# Patient Record
Sex: FEMALE | Race: WHITE
[De-identification: names, ages, dates, MRNs, and addresses within clinical notes are randomized per-mention and may not be internally consistent; named-entity substitution may affect disease eponyms.]

---

## 2018-09-12 ENCOUNTER — HOSPITAL ENCOUNTER (INPATIENT)
Dept: HOSPITAL 41 - JD.OBCHECK | Age: 23
LOS: 2 days | Discharge: HOME | End: 2018-09-14
Attending: OBSTETRICS & GYNECOLOGY | Admitting: OBSTETRICS & GYNECOLOGY
Payer: SELF-PAY

## 2018-09-12 DIAGNOSIS — N85.8: ICD-10-CM

## 2018-09-12 DIAGNOSIS — Z87.891: ICD-10-CM

## 2018-09-12 DIAGNOSIS — Z3A.38: ICD-10-CM

## 2018-09-12 DIAGNOSIS — Z88.0: ICD-10-CM

## 2018-09-12 DIAGNOSIS — O34.219: Primary | ICD-10-CM

## 2018-09-12 PROCEDURE — 3E0R3BZ INTRODUCTION OF ANESTHETIC AGENT INTO SPINAL CANAL, PERCUTANEOUS APPROACH: ICD-10-PCS

## 2018-09-12 PROCEDURE — 10907ZC DRAINAGE OF AMNIOTIC FLUID, THERAPEUTIC FROM PRODUCTS OF CONCEPTION, VIA NATURAL OR ARTIFICIAL OPENING: ICD-10-PCS | Performed by: OBSTETRICS & GYNECOLOGY

## 2018-09-12 PROCEDURE — 00HU33Z INSERTION OF INFUSION DEVICE INTO SPINAL CANAL, PERCUTANEOUS APPROACH: ICD-10-PCS

## 2018-09-12 PROCEDURE — 0UQMXZZ REPAIR VULVA, EXTERNAL APPROACH: ICD-10-PCS | Performed by: OBSTETRICS & GYNECOLOGY

## 2018-09-12 PROCEDURE — 6A550ZT PHERESIS OF CORD BLOOD STEM CELLS, SINGLE: ICD-10-PCS | Performed by: OBSTETRICS & GYNECOLOGY

## 2018-09-12 PROCEDURE — 0KQM0ZZ REPAIR PERINEUM MUSCLE, OPEN APPROACH: ICD-10-PCS | Performed by: OBSTETRICS & GYNECOLOGY

## 2018-09-12 RX ADMIN — Medication SCH ML: at 07:39

## 2018-09-12 RX ADMIN — EPHEDRINE SULFATE PRN MG: 50 INJECTION, SOLUTION INTRAVENOUS at 15:40

## 2018-09-12 RX ADMIN — Medication SCH ML: at 16:25

## 2018-09-12 RX ADMIN — EPHEDRINE SULFATE PRN MG: 50 INJECTION, SOLUTION INTRAVENOUS at 08:52

## 2018-09-12 NOTE — PCM.PNLD
Labor Progress Note





- VS & Meds


Vital Signs: 


 Last Vital Signs











Temp  36.9 C   09/12/18 06:39


 


Pulse  102 H  09/12/18 06:39


 


Resp  18   09/12/18 07:43


 


BP  112/72   09/12/18 06:39


 


Pulse Ox      











Active Medications: 


 Current Medications





Diphenhydramine HCl (Benadryl)  25 mg IVPUSH Q6H PRN


   PRN Reason: pruritis


Ephedrine Sulfate (Ephedrine Sulfate)  5 mg IVPUSH ASDIRECTED PRN


   PRN Reason: Hypotension


   Last Admin: 09/12/18 15:40 Dose:  5 mg


Fentanyl (Sublimaze)  100 mcg EPIDUR Q3H PRN


   PRN Reason: Pain


   Last Admin: 09/12/18 07:39 Dose:  100 mcg


Fentanyl/Bupivacaine HCl (Fentanyl/Bupivacaine/Ns 2 Mcg-0.125% 100 Ml)  100 ml 

EPIDUR ASDIRECTED MARISSA


   Last Admin: 09/12/18 16:25 Dose:  100 ml


Lactated Ringer's (Ringers, Lactated)  1,000 mls @ 100 mls/hr IV ASDIRECTED MARISSA


   Last Admin: 09/12/18 13:59 Dose:  100 mls/hr


Oxytocin/Lactated Ringer's (Pitocin In Lr 10 Units/1,000 Ml)  10 unit in 1,000 

mls @ 500 mls/hr IV .CONTINUOUS MARISSA


Nalbuphine HCl (Nubain)  10 mg IVPUSH Q2H PRN


   PRN Reason: pain


Ondansetron HCl (Zofran)  4 mg IVPUSH Q4H PRN


   PRN Reason: Nausea/Vomiting


Sodium Chloride (Saline Flush)  10 ml FLUSH ASDIRECTED PRN


   PRN Reason: Keep Vein Open











- Uterine Contractions


Uterine Monitoring Mode: External [place]


Contraction Intensity: Moderate to Strong





- Fetal Monitoring


Fetal Monitor Mode: External Ultrasound


Fetal Heart Rate (FHR) Baseline: 155


Fetal Heart Rate (FHR) Variability: Moderate (6-25 bmp)


Fetal Accelerations: Present, 15x15


Fetal Decelerations: Variable


Fetal Strip Review: Category II





- Vaginal Exam


Dilation (cm): 9


Effacement (Percent): 90


Station: 1


Cervical Position: Anterior





- Labor Progress (Free Text)


Labor Progress: 





Patient has made slow, but stead change throughout the day.  Now feeling more 

pressure and with good change in fetal station to +1-+2 station.  Also 9 cm.  

Will recheck in another 30-45 minutes to reassess.

## 2018-09-12 NOTE — PCM.PNLD
Labor Progress Note





- VS & Meds


Vital Signs: 


 Last Vital Signs











Temp  36.9 C   09/12/18 06:39


 


Pulse  102 H  09/12/18 06:39


 


Resp  18   09/12/18 07:43


 


BP  112/72   09/12/18 06:39


 


Pulse Ox      











Active Medications: 


 Current Medications





Diphenhydramine HCl (Benadryl)  25 mg IVPUSH Q6H PRN


   PRN Reason: pruritis


Ephedrine Sulfate (Ephedrine Sulfate)  5 mg IVPUSH ASDIRECTED PRN


   PRN Reason: Hypotension


   Last Admin: 09/12/18 08:52 Dose:  5 mg


Fentanyl (Sublimaze)  100 mcg EPIDUR Q3H PRN


   PRN Reason: Pain


   Last Admin: 09/12/18 07:39 Dose:  100 mcg


Fentanyl/Bupivacaine HCl (Fentanyl/Bupivacaine/Ns 2 Mcg-0.125% 100 Ml)  100 ml 

EPIDUR ASDIRECTED MARISSA


   Last Admin: 09/12/18 07:39 Dose:  100 ml


Lactated Ringer's (Ringers, Lactated)  1,000 mls @ 100 mls/hr IV ASDIRECTED MARISSA


   Last Admin: 09/12/18 10:52 Dose:  100 mls/hr


Oxytocin/Lactated Ringer's (Pitocin In Lr 10 Units/1,000 Ml)  10 unit in 1,000 

mls @ 500 mls/hr IV .CONTINUOUS MARISSA


Nalbuphine HCl (Nubain)  10 mg IVPUSH Q2H PRN


   PRN Reason: pain


Ondansetron HCl (Zofran)  4 mg IVPUSH Q4H PRN


   PRN Reason: Nausea/Vomiting


Sodium Chloride (Saline Flush)  10 ml FLUSH ASDIRECTED PRN


   PRN Reason: Keep Vein Open











- Uterine Contractions


Uterine Monitoring Mode: External [place]


Contraction Intensity: Moderate to Strong





- Fetal Monitoring


Fetal Monitor Mode: External Ultrasound


Fetal Heart Rate (FHR) Baseline: 155


Fetal Heart Rate (FHR) Variability: Moderate (6-25 bmp)


Fetal Accelerations: Present, 15x15


Fetal Decelerations: Late (isolated)


Fetal Strip Review: Category II





- Vaginal Exam


Dilation (cm): 5


Effacement (Percent): 80


Station: -1


Cervical Position: Midposition





- Labor Progress (Free Text)


Labor Progress: 





Doing well.  AROM performed with release of blood tinged clear fluid.  Continue 

present management

## 2018-09-12 NOTE — PCM.PNLD
Labor Progress Note





- VS & Meds


Vital Signs: 


 Last Vital Signs











Temp  36.9 C   09/12/18 06:39


 


Pulse  102 H  09/12/18 06:39


 


Resp  18   09/12/18 07:43


 


BP  112/72   09/12/18 06:39


 


Pulse Ox      











Active Medications: 


 Current Medications





Diphenhydramine HCl (Benadryl)  25 mg IVPUSH Q6H PRN


   PRN Reason: pruritis


Ephedrine Sulfate (Ephedrine Sulfate)  5 mg IVPUSH ASDIRECTED PRN


   PRN Reason: Hypotension


   Last Admin: 09/12/18 08:52 Dose:  5 mg


Fentanyl (Sublimaze)  100 mcg EPIDUR Q3H PRN


   PRN Reason: Pain


   Last Admin: 09/12/18 07:39 Dose:  100 mcg


Fentanyl/Bupivacaine HCl (Fentanyl/Bupivacaine/Ns 2 Mcg-0.125% 100 Ml)  100 ml 

EPIDUR ASDIRECTED MARISSA


   Last Admin: 09/12/18 07:39 Dose:  100 ml


Lactated Ringer's (Ringers, Lactated)  1,000 mls @ 100 mls/hr IV ASDIRECTED MARISSA


   Last Admin: 09/12/18 10:52 Dose:  100 mls/hr


Oxytocin/Lactated Ringer's (Pitocin In Lr 10 Units/1,000 Ml)  10 unit in 1,000 

mls @ 500 mls/hr IV .CONTINUOUS MARISSA


Nalbuphine HCl (Nubain)  10 mg IVPUSH Q2H PRN


   PRN Reason: pain


Ondansetron HCl (Zofran)  4 mg IVPUSH Q4H PRN


   PRN Reason: Nausea/Vomiting


Sodium Chloride (Saline Flush)  10 ml FLUSH ASDIRECTED PRN


   PRN Reason: Keep Vein Open











- Uterine Contractions


Uterine Monitoring Mode: External [place]


Contraction Intensity: Moderate to Strong





- Fetal Monitoring


Fetal Monitor Mode: External Ultrasound


Fetal Heart Rate (FHR) Baseline: 150


Fetal Heart Rate (FHR) Variability: Moderate (6-25 bmp)


Fetal Accelerations: Present, 15x15


Fetal Decelerations: Variable


Fetal Strip Review: Category II





- Vaginal Exam


Dilation (cm): 6


Effacement (Percent): 80


Station: 0


Cervical Position: Midposition





- Labor Progress (Free Text)


Labor Progress: 





Patient doing well.  Continues to make slow, but steady cervical change.  

Continue present management

## 2018-09-12 NOTE — PCM.DEL
L & D Note





- General Info


Date of Service: 18





- Delivery Note


Labor: Spontaneous


Delivery Outcome: Livebirth


Infant Delivery Method: Spontaneous Vaginal Delivery-Single


Infant Delivery Mode: Spontaneous


Birth Presentation: Right Occiput Anterior (SHANNEN)


Nuchal Cord: None


Anesthesia Type: Epidural


Amniotic Fluid Description: Bloody


Episiotomy Type: None


Laceration: 2nd Degree, Labial


Suture type: Vicryl


Suture size: 3-0


Placenta: Intact, Spontaneous


Cord: 3 Vessels


Estimated Blood Loss: 400


Resuscitation Needed: Yes


: Bulb Syringe, Stimulated, Warmed, [place] Used, Warmer Used


APGAR Score 1 min: 8


APGAR Score 5 min: 9


Delivery Comments (Free Text/Narrative):: 





Patient found to be complete and began pushing.  With maternal pushing effort 

fetal head delivered from an SHANNEN presentation.  No nuchal cord present.  With 

gentle downward traction the fetal shoulders and body delivered.  Infant placed 

on maternal abdomen.  Cord clamped and cut.  cord blood obtained.  Placenta 

allowed time to separate and expelled intact.  Inspection of the perineum 

showed a 2nd degree vaginal and left labial laceration.  These were repaired 

with 2-0 and 3-0 sutures respectively.  





- General Info


Date of Service: 18





- Patient Data


Vitals - Most Recent: 


 Last Vital Signs











Temp  36.9 C   18 06:39


 


Pulse  102 H  18 06:39


 


Resp  18   18 07:43


 


BP  112/72   18 06:39


 


Pulse Ox      











Weight - Most Recent: 68.901 kg


Lab Results Last 24 Hours: 


 Laboratory Results - last 24 hr











  18 Range/Units





  07:00 07:00 07:00 


 


WBC  12.68 H    (3.98-10.04)  K/mm3


 


RBC  3.85 L    (3.98-5.22)  M/mm3


 


Hgb  12.0    (11.2-15.7)  gm/L


 


Hct  36.6    (34.1-44.9)  %


 


MCV  95.1 H    (79.4-94.8)  fl


 


MCH  31.2    (25.6-32.2)  pg


 


MCHC  32.8    (32.2-35.5)  g/dl


 


RDW Std Deviation  44.6    (36.4-46.3)  fL


 


Plt Count  212    (182-369)  K/mm3


 


MPV  10.0    (9.4-12.3)  fl


 


RPR   Non-reactive   (NONREACTIVE)  


 


Blood Type    A POSITIVE  


 


Gel Antibody Screen    Negative  











Med Orders - Current: 


 Current Medications





Diphenhydramine HCl (Benadryl)  25 mg IVPUSH Q6H PRN


   PRN Reason: pruritis


Ephedrine Sulfate (Ephedrine Sulfate)  5 mg IVPUSH ASDIRECTED PRN


   PRN Reason: Hypotension


   Last Admin: 18 15:40 Dose:  5 mg


Fentanyl (Sublimaze)  100 mcg EPIDUR Q3H PRN


   PRN Reason: Pain


   Last Admin: 18 07:39 Dose:  100 mcg


Fentanyl/Bupivacaine HCl (Fentanyl/Bupivacaine/Ns 2 Mcg-0.125% 100 Ml)  100 ml 

EPIDUR ASDIRECTED MARISSA


   Last Admin: 18 16:25 Dose:  100 ml


Lactated Ringer's (Ringers, Lactated)  1,000 mls @ 100 mls/hr IV ASDIRECTED MARISSA


   Last Admin: 18 13:59 Dose:  100 mls/hr


Oxytocin/Lactated Ringer's (Pitocin In Lr 10 Units/1,000 Ml)  10 unit in 1,000 

mls @ 500 mls/hr IV .CONTINUOUS MARISSA


   Last Admin: 18 17:16 Dose:  500 mls/hr


Nalbuphine HCl (Nubain)  10 mg IVPUSH Q2H PRN


   PRN Reason: pain


Ondansetron HCl (Zofran)  4 mg IVPUSH Q4H PRN


   PRN Reason: Nausea/Vomiting


Sodium Chloride (Saline Flush)  10 ml FLUSH ASDIRECTED PRN


   PRN Reason: Keep Vein Open





Discontinued Medications





Methylergonovine Maleate (Methergine) Confirm Administered Dose 0.2 mg .ROUTE 

.STK-MED ONE


   Stop: 18 17:13


   Last Admin: 18 17:17 Dose:  0.2 mg


Methylergonovine Maleate (Methergine)  0.2 mg IM NOW STA


   Stop: 18 17:37











- Problem List & Annotations


(1) 38 weeks gestation of pregnancy


SNOMED Code(s): 68767112


   Code(s): Z3A.38 - 38 WEEKS GESTATION OF PREGNANCY   Status: Acute   Current 

Visit: Yes   





(2) Normal labor


SNOMED Code(s): 89331140


   Code(s): O80 - ENCOUNTER FOR FULL-TERM UNCOMPLICATED DELIVERY; Z37.9 - 

OUTCOME OF DELIVERY, UNSPECIFIED   Status: Acute   Current Visit: Yes   





(3) History of 


SNOMED Code(s): 622125500


   Code(s): Z98.891 - HISTORY OF UTERINE SCAR FROM PREVIOUS SURGERY   Status: 

Acute   Current Visit: Yes   





(4) Desires  (vaginal birth after ) trial


SNOMED Code(s): 805866844, 245049616


   Code(s): O34.219 - MATERNAL CARE FOR UNSP TYPE SCAR FROM PREVIOUS  

DEL   Status: Acute   Current Visit: Yes   





(5) , delivered, current hospitalization


SNOMED Code(s): 287375137


   Code(s): O34.219 - MATERNAL CARE FOR UNSP TYPE SCAR FROM PREVIOUS  

DEL   Status: Acute   Current Visit: Yes   





- Problem List Review


Problem List Initiated/Reviewed/Updated: Yes





- My Orders


Last 24 Hours: 


My Active Orders





18 06:55


Patient Status [ADT] Routine 


Activity as Tolerated [RC] PFP 


Communication Order [RC] ASDIRECTED 


Fetal Heart Tones [RC] ASDIRECTED 


Fetal Non Stress Test [RC] PER UNIT ROUTINE 


Notify Provider [RC] PFP 


Notify Provider [RC] PRN 


Peripheral IV Care [RC] .AS DIRECTED 


Vital Signs [RC] PER UNIT ROUTINE 


Nalbuphine [Nubain]   10 mg IVPUSH Q2H PRN 


Ondansetron [Zofran]   4 mg IVPUSH Q4H PRN 


Sodium Chloride 0.9% [Saline Flush]   10 ml FLUSH ASDIRECTED PRN 


Electronic Fetal Heart Tones Ext w TOCO [WOMSER] Routine 


Electronic Fetal Heart Tones Internal [WOMSER] Per Unit Routine 


Peripheral IV Insertion Adult [OM.PC] Routine 


Resuscitation Status Routine 





18 07:00


PATIENT RETYPE [BBK] Routine 


TYPE AND SCREEN [BBK] Routine 


Lactated Ringers [Ringers, Lactated] 1,000 ml IV ASDIRECTED 


Oxytocin/Lactated Ringers [Pitocin in LR 10 Units/1,000 ML] 10 unit in 1,000 ml 

IV .CONTINUOUS 





18 07:40


Insert Hall Catheter [Insert Urinary Catheter] [OM.PC] Q24H 





18 10:10


Urinary Catheter Assessment [RC] ASDIRECTED 





18 17:38


Patient Status Manage Transfer [TRANSFER] Routine 





18 Breakfast


Clear Liquid Diet [DIET] 














- Assessment


Assessment:: 





22 y/o G3 now  PPD#0 from 





- Plan


Plan:: 








* Routine cares


* Encourage breast feeding


* Discharge home in 1-2 days

## 2018-09-12 NOTE — PCM.LDHP
L&D History of Present Illness





- General


Date of Service: 18


Admit Problem/Dx: 


 Patient Status Order with Admit Dx/Problem





18 06:55


Patient Status [ADT] Routine 








 Admission Diagnosis/Problem











Admission Diagnosis/Problem    Normal labor














Source of Information: Patient


History Limitations: Reports: No Limitations





- History of Present Illness


Introduction:: 





Patient is a 24 y/o  at 38 5/7 wks who presents this AM in labor.  

States contractions started around 0300 this AM.  Otherwise doing well.   No 

LOF.   





Has a history fo  for breech (identified when patient in labor) and 

then a repeat when presented in labor as was uncertain about .  Is planned 

for a  next week, but now considering 





- Related Data


Allergies/Adverse Reactions: 


 Allergies











Allergy/AdvReac Type Severity Reaction Status Date / Time


 


Penicillins AdvReac  Nausea Verified 18 07:01











Home Medications: 


 Home Meds





PNV95/Ferrous Fumarate/FA [Prenatal Tablet] 1 each PO DAILY 18 [History]











Past Medical History


OB/GYN History: Reports: Pregnancy


: 3


Para: 2


LMP (Approximate): Pregnant





- Past Surgical History


Female  Surgical History: Reports:  Section (x2)





Social & Family History





- Tobacco Use


Smoking Status *Q: Never Smoker





- Alcohol Use


Alcohol Use History: No





- Recreational Drug Use


Recreational Drug Use: No





H&P Review of Systems





- Review of Systems:


Review Of Systems: See Below


General: Reports: No Symptoms


Pulmonary: Reports: No Symptoms


Cardiovascular: Reports: No Symptoms


Gastrointestinal: Reports: No Symptoms


Genitourinary: Reports: No Symptoms


Musculoskeletal: Reports: No Symptoms


Neurological: Reports: No Symptoms





L&D Exam





- Exam


Exam: See Below





- OB Specific


Contraction Intensity: Moderate


Fetal Movement: Active


Fetal Heart Tones: Present


Fetal Heart Tones per Min: 150


Fetal Heart Rate (FHR) Variability: Moderate (6-25 bmp)


Birth Presentation: Vertex





- Davidson Score


Davidson Score Cervix Position: Midposition


Davidson Score Consistency: Soft


Davidson Score Effacement: 51-70%


Davidson Score Dilation: 3-4 cm


Davidson Score Infant's Station: -2


Davidson Score Total: 8





- Exam


General: Alert, Oriented, Cooperative


Lungs: Clear to Auscultation, Normal Respiratory Effort


Cardiovascular: Regular Rate, Regular Rhythm


GI/Abdominal Exam: Soft, Non-Tender


Genitourinary: Normal external exam


Extremities: Normal Inspection


Skin: Warm, Dry, Intact





- Patient Data


Result Diagrams: 


 18 07:00








- Problem List


(1) 38 weeks gestation of pregnancy


SNOMED Code(s): 74587276


   ICD Code: Z3A.38 - 38 WEEKS GESTATION OF PREGNANCY   Status: Acute   Current 

Visit: Yes   





(2) Normal labor


SNOMED Code(s): 95346656


   ICD Code: O80 - ENCOUNTER FOR FULL-TERM UNCOMPLICATED DELIVERY; Z37.9 - 

OUTCOME OF DELIVERY, UNSPECIFIED   Status: Acute   Current Visit: Yes   





(3) History of 


SNOMED Code(s): 419976684


   ICD Code: Z98.891 - HISTORY OF UTERINE SCAR FROM PREVIOUS SURGERY   Status: 

Acute   Current Visit: Yes   





(4) Desires  (vaginal birth after ) trial


SNOMED Code(s): 433490623, 767183032


   ICD Code: O34.219 - MATERNAL CARE FOR UNSP TYPE SCAR FROM PREVIOUS  

DEL   Status: Acute   Current Visit: Yes   


Problem List Initiated/Reviewed/Updated: Yes


Orders Last 24hrs: 


 Active Orders 24 hr











 Category Date Time Status


 


 Patient Status [ADT] Routine ADT  18 06:55 Ordered


 


 Activity as Tolerated [RC] PFP Care  18 06:55 Ordered


 


 Communication Order [RC] ASDIRECTED Care  18 06:55 Ordered


 


 Fetal Heart Tones [RC] ASDIRECTED Care  18 06:55 Ordered


 


 Fetal Non Stress Test [RC] PER UNIT ROUTINE Care  18 06:55 Ordered


 


 Notify Provider [RC] PFP Care  18 06:55 Ordered


 


 Notify Provider [RC] PRN Care  18 06:55 Ordered


 


 Peripheral IV Care [RC] .AS DIRECTED Care  18 06:55 Ordered


 


 Vital Signs [RC] PER UNIT ROUTINE Care  18 06:55 Ordered


 


 Clear Liquid Diet [DIET] Diet  18 Breakfast Ordered


 


 CBC W/O DIFF,HEMOGRAM [HEME] Routine Lab  18 06:55 Ordered


 


 RAPID PLASMA REAGIN,RPR [CHEM] Routine Lab  18 06:55 Ordered


 


 TYPE AND SCREEN [BBK] Routine Lab  18 06:55 Ordered


 


 Lactated Ringers [Ringers, Lactated] 1,000 ml Med  18 07:00 Ordered





 IV ASDIRECTED   


 


 Nalbuphine [Nubain] Med  18 06:55 Ordered





 10 mg IVPUSH Q2H PRN   


 


 Ondansetron [Zofran] Med  18 06:55 Ordered





 4 mg IVPUSH Q4H PRN   


 


 Oxytocin/Lactated Ringers [Pitocin in LR 10 Units/1,000 Med  18 07:00 

Ordered





 ML]   





 10 unit in 1,000 ml IV .CONTINUOUS   


 


 Sodium Chloride 0.9% [Saline Flush] Med  18 06:55 Ordered





 10 ml FLUSH ASDIRECTED PRN   


 


 Electronic Fetal Heart Tones Ext w TOCO [WOMSER] Ot  18 06:55 Ordered





 Routine   


 


 Electronic Fetal Heart Tones Internal [WOMSER] Per Unit Ot  18 06:55 

Ordered





 Routine   


 


 Peripheral IV Insertion Adult [OM.PC] Routine Ot  18 06:55 Ordered


 


 Resuscitation Status Routine Resus Stat  18 06:55 Ordered











Assessment/Plan Comment:: 





Patient presents in active labor.  Reviewed options of management going 

forward.  Does want to do a trial of labor.  Aware of risks of uterine rupture.

  Will monitor closely.  If any concerns would need to proceed with RLTCS.  

Labs ordered.  Epidural for pain management.  GBS negative, no need for 

antibiotics

## 2018-09-12 NOTE — PCM.PREANE
Preanesthetic Assessment





- Procedure


Proposed Procedure: 





madison





- Anesthesia/Transfusion/Family Hx


Anesthesia History: Prior Anesthesia Without Reaction


Family History of Anesthesia Reaction: No


Transfusion History: No Prior Transfusion(s)





- Review of Systems


General: No Symptoms


Pulmonary: No Symptoms


Cardiovascular: No Symptoms


Gastrointestinal: No Symptoms


Neurological: No Symptoms


Other: Reports: None





- Physical Assessment


NPO Status Date: 18


NPO Status Time: 21:00 (ice chips today)


Respiratory Rate: 18


Vital Signs: 





 Last Vital Signs











Temp  98.5 F   18 06:39


 


Pulse  102 H  18 06:39


 


Resp  18   18 06:39


 


BP  112/72   18 06:39


 


Pulse Ox      











Height: 5 ft 2 in


Weight: 68.901 kg


ASA Class: 2


Mental Status: Alert & Oriented x3


Airway Class: Mallampati = 1


Dentition: Reports: Normal Dentition


Thyro-Mental Finger Breadths: 3


Mouth Opening Finger Breadths: 3


ROM/Head Extension: Full


Lungs: Clear to Auscultation, Normal Respiratory Effort


Cardiovascular: Regular Rate, Regular Rhythm





- Lab


Values: 





 Laboratory Last Values











WBC  12.68 K/mm3 (3.98-10.04)  H  18  07:00    


 


RBC  3.85 M/mm3 (3.98-5.22)  L  18  07:00    


 


Hgb  12.0 gm/L (11.2-15.7)   18  07:00    


 


Hct  36.6 % (34.1-44.9)   18  07:00    


 


MCV  95.1 fl (79.4-94.8)  H  18  07:00    


 


MCH  31.2 pg (25.6-32.2)   18  07:00    


 


MCHC  32.8 g/dl (32.2-35.5)   18  07:00    


 


RDW Std Deviation  44.6 fL (36.4-46.3)   18  07:00    


 


Plt Count  212 K/mm3 (182-369)   18  07:00    


 


MPV  10.0 fl (9.4-12.3)   18  07:00    














- Allergies


Allergies/Adverse Reactions: 


 Allergies











Allergy/AdvReac Type Severity Reaction Status Date / Time


 


Penicillins AdvReac  Nausea Verified 18 07:01














- Blood


Blood Available: No





- Acknowledgements


Anesthesia Type Planned: Epidural


Pt an Appropriate Candidate for the Planned Anesthesia: Yes


Alternatives and Risks of Anesthesia Discussed w Pt/Guardian: Yes


Pt/Guardian Understands and Agrees with Anesthesia Plan: Yes





PreAnesthesia Questionnaire


Cardiovascular History: Reports: None


Respiratory History: Reports: None


Gastrointestinal History: Reports: GERD (with preg)


OB/GYN History: Reports: Pregnancy


: 3 (38 plus weeks)


Para: 2


Other OB/BYN History: 2 previous C/S


Musculoskeletal History: Reports: None


Oncologic (Cancer) History: Reports: None





- SUBSTANCE USE


Smoking Status *Q: Former Smoker (1 year- quit 5 years ago)


Tobacco Use Within Last Twelve Months: No


Second Hand Smoke Exposure: No


Days Per Week of Alcohol Use: 0


Recreational Drug Use History: No





- HOME MEDS


Home Medications: 


 Home Meds





PNV95/Ferrous Fumarate/FA [Prenatal Tablet] 1 each PO DAILY 18 [History]











- CURRENT (IN HOUSE) MEDS


Current Meds: 





 Current Medications





Diphenhydramine HCl (Benadryl)  25 mg IVPUSH Q6H PRN


   PRN Reason: pruritis


Ephedrine Sulfate (Ephedrine Sulfate)  5 mg IVPUSH ASDIRECTED PRN


   PRN Reason: Hypotension


Fentanyl (Sublimaze)  100 mcg EPIDUR Q3H PRN


   PRN Reason: Pain


   Last Admin: 18 07:39 Dose:  100 mcg


Fentanyl/Bupivacaine HCl (Fentanyl/Bupivacaine/Ns 2 Mcg-0.125% 100 Ml)  100 ml 

EPIDUR ASDIRECTED [facility]


   Last Admin: 18 07:39 Dose:  100 ml


Lactated Ringer's (Ringers, Lactated)  1,000 mls @ 100 mls/hr IV ASDIRECTED MARISSA


   Last Admin: 18 07:19 Dose:  100 mls/hr


Oxytocin/Lactated Ringer's (Pitocin In Lr 10 Units/1,000 Ml)  10 unit in 1,000 

mls @ 500 mls/hr IV .CONTINUOUS MARISSA


Nalbuphine HCl (Nubain)  10 mg IVPUSH Q2H PRN


   PRN Reason: pain


Ondansetron HCl (Zofran)  4 mg IVPUSH Q4H PRN


   PRN Reason: Nausea/Vomiting


Sodium Chloride (Saline Flush)  10 ml FLUSH ASDIRECTED PRN


   PRN Reason: Keep Vein Open

## 2018-09-13 RX ADMIN — Medication PRN TUBE: at 08:41

## 2018-09-13 NOTE — [id number]
Post Anesthesia Note





- EVALUATION WITHIN 48HRS OF ANESTHETIC


Vital Signs in Normal Range: Yes


Patient Participated in Evaluation: Yes


Respiratory Function Stable: Yes


Airway Patent: Yes


Cardiovascular Function Stable: Yes


Hydration Status Stable: Yes


Pain Control Satisfactory: Yes


Nausea and Vomiting Control Satisfactory: Yes


Mental Status Recovered: Yes





- COMMENTS/OBSERVATIONS


Free Text/Narrative:: 





Kenyatta has some mild back soreness. No complications noted. She has no further 

questions at this time.

## 2018-09-13 NOTE — PCM.PNPP
- General Info


Date of Service: 18


Functional Status: Reports: Pain Controlled, Tolerating Diet, Ambulating, 

Urinating





- Review of Systems


General: Reports: No Symptoms


Pulmonary: Reports: No Symptoms


Cardiovascular: Reports: No Symptoms


Gastrointestinal: Reports: No Symptoms


Genitourinary: Reports: No Symptoms


Musculoskeletal: Reports: No Symptoms





- Patient Data


Vital Signs - Most Recent: 


 Last Vital Signs











Temp  36.2 C   18 02:30


 


Pulse  108 H  18 02:30


 


Resp  16   18 02:30


 


BP  120/63   18 02:30


 


Pulse Ox  98   18 02:30











Weight - Most Recent: 68.901 kg


I&O - Last 24 Hours: 


 Intake & Output











 18





 14:59 22:59 06:59


 


Intake Total   480


 


Output Total  1250 


 


Balance  -1250 480











Lab Results - Last 24 Hours: 


 Laboratory Results - last 24 hr











  18 Range/Units





  07:00 07:00 07:00 


 


WBC  12.68 H    (3.98-10.04)  K/mm3


 


RBC  3.85 L    (3.98-5.22)  M/mm3


 


Hgb  12.0    (11.2-15.7)  gm/L


 


Hct  36.6    (34.1-44.9)  %


 


MCV  95.1 H    (79.4-94.8)  fl


 


MCH  31.2    (25.6-32.2)  pg


 


MCHC  32.8    (32.2-35.5)  g/dl


 


RDW Std Deviation  44.6    (36.4-46.3)  fL


 


Plt Count  212    (182-369)  K/mm3


 


MPV  10.0    (9.4-12.3)  fl


 


RPR   Non-reactive   (NONREACTIVE)  


 


Blood Type    A POSITIVE  


 


Gel Antibody Screen    Negative  











Med Orders - Current: 


 Current Medications





Acetaminophen (Tylenol)  650 mg PO Q4H PRN


   PRN Reason: mild pain or fever


Benzocaine/Menthol (Dermoplast Pain Relief Spray)  0 gm TOP ASDIRECTED PRN


   PRN Reason: Perineal Comfort Measure


   Last Admin: 18 20:39 Dose:  1 canister


Docusate Sodium (Colace)  100 mg PO BID PRN


   PRN Reason: Constipation


Emollient Ointment (Lansinoh Hpa)  0 gm TOP ASDIRECTED PRN


   PRN Reason: Sore Nipples


Ibuprofen (Motrin)  600 mg PO Q6H PRN


   PRN Reason: Mild pain or fever


   Last Admin: 18 02:27 Dose:  600 mg


Witch Hazel (Tucks)  1 pad TOP ASDIRECTED PRN


   PRN Reason: Hemorrhoid pain


   Last Admin: 18 20:38 Dose:  1 canister





Discontinued Medications





Diphenhydramine HCl (Benadryl)  25 mg IVPUSH Q6H PRN


   PRN Reason: pruritis


Ephedrine Sulfate (Ephedrine Sulfate)  5 mg IVPUSH ASDIRECTED PRN


   PRN Reason: Hypotension


   Last Admin: 18 15:40 Dose:  5 mg


Fentanyl (Sublimaze)  100 mcg EPIDUR Q3H PRN


   PRN Reason: Pain


   Last Admin: 18 07:39 Dose:  100 mcg


Fentanyl/Bupivacaine HCl (Fentanyl/Bupivacaine/Ns 2 Mcg-0.125% 100 Ml)  100 ml 

EPIDUR ASDIRECTED MARISSA


   Last Admin: 18 16:25 Dose:  100 ml


Lactated Ringer's (Ringers, Lactated)  1,000 mls @ 100 mls/hr IV ASDIRECTED MARISSA


   Last Admin: 18 13:59 Dose:  100 mls/hr


Oxytocin/Lactated Ringer's (Pitocin In Lr 10 Units/1,000 Ml)  10 unit in 1,000 

mls @ 500 mls/hr IV .CONTINUOUS MARISSA


   Last Admin: 18 17:16 Dose:  500 mls/hr


Oxytocin/Lactated Ringer's (Pitocin In Lr 10 Units/1,000 Ml) Confirm 

Administered Dose 10 unit in 1,000 mls @ as directed IV .STK-MED ONE


   Stop: 18 18:53


   Last Admin: 18 19:00 Dose:  200 mls/hr


Oxytocin/Lactated Ringer's (Pitocin In Lr 10 Units/1,000 Ml)  10 unit in 1,000 

mls @ 200 mls/hr IV NOW STA


   Stop: 18 00:07


Methylergonovine Maleate (Methergine) Confirm Administered Dose 0.2 mg .ROUTE 

.STK-MED ONE


   Stop: 18 17:13


   Last Admin: 18 17:17 Dose:  0.2 mg


Methylergonovine Maleate (Methergine)  0.2 mg IM NOW STA


   Stop: 18 17:37


Nalbuphine HCl (Nubain)  10 mg IVPUSH Q2H PRN


   PRN Reason: pain


Ondansetron HCl (Zofran)  4 mg IVPUSH Q4H PRN


   PRN Reason: Nausea/Vomiting


Sodium Chloride (Saline Flush)  10 ml FLUSH ASDIRECTED PRN


   PRN Reason: Keep Vein Open











- Infant Interaction


Infant Disposition, Postpartum: [place] in Room with Family


Infant Interaction: Holding Infant


Infant Feeding:  Infant; Nursed Well


Support Person: 





- Postpartum Recovery Exam


Fundal Tone: Firm


Fundal Level: At Umbilicus


Fundal Placement: Midline


Lochia Amount: Small, Moderate


Lochia Color: Rubra/Red


Perineum Description: Edematous


Episiotomy/Laceration: Approximated


Bladder Status: Voiding


Urinary Elimination: Voided





- Exam


General: Alert, Oriented, Cooperative


GI/Abdominal Exam: Soft, Non-Tender


Extremities: Normal Inspection


Skin: Warm, Dry, Intact





- Problem List & Annotations


(1) 38 weeks gestation of pregnancy


SNOMED Code(s): 31561100


   Code(s): Z3A.38 - 38 WEEKS GESTATION OF PREGNANCY   Status: Acute   Current 

Visit: Yes   





(2) Normal labor


SNOMED Code(s): 24206291


   Code(s): O80 - ENCOUNTER FOR FULL-TERM UNCOMPLICATED DELIVERY; Z37.9 - 

OUTCOME OF DELIVERY, UNSPECIFIED   Status: Acute   Current Visit: Yes   





(3) History of 


SNOMED Code(s): 362564717


   Code(s): Z98.891 - HISTORY OF UTERINE SCAR FROM PREVIOUS SURGERY   Status: 

Acute   Current Visit: Yes   





(4) Desires  (vaginal birth after ) trial


SNOMED Code(s): 226863033, 175835424


   Code(s): O34.219 - MATERNAL CARE FOR UNSP TYPE SCAR FROM PREVIOUS  

DEL   Status: Acute   Current Visit: Yes   





(5) , delivered, current hospitalization


SNOMED Code(s): 829258373


   Code(s): O34.219 - MATERNAL CARE FOR UNSP TYPE SCAR FROM PREVIOUS  

DEL   Status: Acute   Current Visit: Yes   





- Problem List Review


Problem List Initiated/Reviewed/Updated: Yes





- My Orders


Last 24 Hours: 


My Active Orders





18 06:55


Fetal Heart Tones [RC] ASDIRECTED 


Peripheral IV Care [RC] .AS DIRECTED 


Resuscitation Status Routine 





18 17:45


Activity as Tolerated [RC] PER UNIT ROUTINE 


Vital Signs [RC] 03,09,15,21 


Acetaminophen [Tylenol]   650 mg PO Q4H PRN 


Benzocaine/Menthol [Dermoplast Pain Relief Spray]   See Dose Instructions  TOP 

ASDIRECTED PRN 


Docusate Sodium [Colace]   100 mg PO BID PRN 


Ibuprofen [Motrin]   600 mg PO Q6H PRN 


Lanolin [Lansinoh HPA]   See Dose Instructions  TOP ASDIRECTED PRN 


Witch Hazel [Tucks]   1 pad TOP ASDIRECTED PRN 


Assess Lochia [WOMSER] Per Unit Routine 


Assess Uterine Involution [WOMSER] Per Unit Routine 


Breast Pump [WOMSER] Per Unit Routine 


Heat Therapy [OM.PC] PRN 


Ice Therapy [OM.PC] Per Unit Routine 


Perineal Care [OM.PC] Per Unit Routine 


Peripheral IV Discontinue [OM.PC] Routine 


Sitz Bath [OM.PC] Per Unit Routine 





18 19:15


Urinary Catheter Insertion [Insert Urinary Catheter] [OM.PC] Q24H 





18 Dinner


Regular Diet [DIET] 





18 17:45


Heat Therapy [OM.PC] PRN 














- Assessment


Assessment:: 





24 y/o G3 now  PPD#1 from 





- Plan


Plan:: 








* Routine cares


* Encourage breast feeding


* Discharge home late today vs tomorrow depending upon patient preference

## 2018-09-14 RX ADMIN — Medication PRN TUBE: at 05:20

## 2018-09-14 NOTE — PCM.DCSUM1
**Discharge Summary





- Discharge Data


Discharge Date: 18


Discharge Disposition: Home, Self-Care 01


Condition: Good





- Discharge Diagnosis/Problem(s)


(1) 38 weeks gestation of pregnancy


SNOMED Code(s): 29222206


   ICD Code: Z3A.38 - 38 WEEKS GESTATION OF PREGNANCY   Status: Acute   Current 

Visit: Yes   





(2) Normal labor


SNOMED Code(s): 48797257


   ICD Code: O80 - ENCOUNTER FOR FULL-TERM UNCOMPLICATED DELIVERY; Z37.9 - 

OUTCOME OF DELIVERY, UNSPECIFIED   Status: Acute   Current Visit: Yes   





(3) History of 


SNOMED Code(s): 495916153


   ICD Code: Z98.891 - HISTORY OF UTERINE SCAR FROM PREVIOUS SURGERY   Status: 

Acute   Current Visit: Yes   





(4) Desires  (vaginal birth after ) trial


SNOMED Code(s): 422867423, 986898159


   ICD Code: O34.219 - MATERNAL CARE FOR UNSP TYPE SCAR FROM PREVIOUS  

DEL   Status: Acute   Current Visit: Yes   





(5) , delivered, current hospitalization


SNOMED Code(s): 641222563


   ICD Code: O34.219 - MATERNAL CARE FOR UNSP TYPE SCAR FROM PREVIOUS  

DEL   Status: Acute   Current Visit: Yes   





- Patient Summary/Data


Complications: None


Consults: 


None


Recommended Follow-up Testing/Procedures: 


Follow up in 3-6 weeks or postpartum check 


Hospital Course: 


22 y/o  presented at 38 5/7 wks in labor.  Patient had a history of 2 

prior c-sections and was counseled on options of TOLAC vs proceeding with 

RLTCS.  Ultimately she elected to proceed with TOLAC.  She progressed well with 

only AROM for augmentation.  She achieved complete dilation and underwent a 

rapid .  See delivery note for full details.  Postpartum she did well and 

was discharged home on PPD#2 





- Patient Instructions


Diet: Regular Diet as Tolerated


Activity: As Tolerated


Driving: May Drive Today


Showering/Bathing: May Shower


Showering/Bathing, Other: May Bathe 


Notify Provider of: Fever, Increased Pain, Swelling and Redness, Drainage, 

Nausea and/or Vomiting





- Discharge Plan


*PRESCRIPTION DRUG MONITORING PROGRAM REVIEWED*: Not Applicable


*COPY OF PRESCRIPTION DRUG MONITORING REPORT IN PATIENT ANU: Not Applicable


Home Medications: 


 Home Meds





PNV95/Ferrous Fumarate/FA [Prenatal Tablet] 1 each PO DAILY 18 [History]


Docusate Sodium [Colace] 100 mg PO BID PRN  cap 18 [Rx]


Ibuprofen [Motrin] 600 mg PO Q6H PRN  tablet 18 [Rx]








Patient Handouts:  Home Care Instructions for Mom, Breastfeeding Tips for a 

Good Latch


Referrals: 


Oma Jain MD [Primary Care Provider] -  (3-6 weeks for postpartum check )





- Discharge Summary/Plan Comment


DC Time >30 min.: No





- Patient Data


Vitals - Most Recent: 


 Last Vital Signs











Temp  36.7 C   18 03:17


 


Pulse  87   18 03:17


 


Resp  14   18 03:17


 


BP  107/54 L  18 03:17


 


Pulse Ox  100   18 03:17











Weight - Most Recent: 68.901 kg


I&O - Last 24 hours: 


 Intake & Output











 18





 14:59 22:59 06:59


 


Intake Total 120 60 


 


Balance 120 60 











Med Orders - Current: 


 Current Medications





Acetaminophen (Tylenol)  650 mg PO Q4H PRN


   PRN Reason: mild pain or fever


Benzocaine/Menthol (Dermoplast Pain Relief Spray)  0 gm TOP ASDIRECTED PRN


   PRN Reason: Perineal Comfort Measure


   Last Admin: 18 20:39 Dose:  1 canister


Docusate Sodium (Colace)  100 mg PO BID PRN


   PRN Reason: Constipation


   Last Admin: 18 08:41 Dose:  100 mg


Emollient Ointment (Lansinoh Hpa)  0 gm TOP ASDIRECTED PRN


   PRN Reason: Sore Nipples


   Last Admin: 18 05:20 Dose:  1 tube


Ibuprofen (Motrin)  600 mg PO Q6H PRN


   PRN Reason: Mild pain or fever


   Last Admin: 18 05:22 Dose:  600 mg


Witch Hazel (Tucks)  1 pad TOP ASDIRECTED PRN


   PRN Reason: Hemorrhoid pain


   Last Admin: 18 20:38 Dose:  1 canister





Discontinued Medications





Bupivacaine HCl (Sensorcaine-Mpf 0.25%)  10 ml .ROUTE .STK-MED ONE


   Stop: 18 22:01


Diphenhydramine HCl (Benadryl)  25 mg IVPUSH Q6H PRN


   PRN Reason: pruritis


Ephedrine Sulfate (Ephedrine Sulfate)  5 mg IVPUSH ASDIRECTED PRN


   PRN Reason: Hypotension


   Last Admin: 18 15:40 Dose:  5 mg


Ephedrine Sulfate (Ephedrine Sulfate)  100 mg .ROUTE .STK-MED ONE


   Stop: 18 22:01


Fentanyl (Sublimaze)  100 mcg EPIDUR Q3H PRN


   PRN Reason: Pain


   Last Admin: 18 07:39 Dose:  100 mcg


Fentanyl/Bupivacaine HCl (Fentanyl/Bupivacaine/Ns 2 Mcg-0.125% 100 Ml)  100 ml 

EPIDUR ASDIRECTED MARISSA


   Last Admin: 18 16:25 Dose:  100 ml


Lactated Ringer's (Ringers, Lactated)  1,000 mls @ 100 mls/hr IV ASDIRECTED MARISSA


   Last Admin: 18 13:59 Dose:  100 mls/hr


Oxytocin/Lactated Ringer's (Pitocin In Lr 10 Units/1,000 Ml)  10 unit in 1,000 

mls @ 500 mls/hr IV .CONTINUOUS MARISSA


   Last Admin: 18 17:16 Dose:  500 mls/hr


Oxytocin/Lactated Ringer's (Pitocin In Lr 10 Units/1,000 Ml) Confirm 

Administered Dose 10 unit in 1,000 mls @ as directed IV .STK-MED ONE


   Stop: 18 18:53


   Last Admin: 18 19:00 Dose:  200 mls/hr


Oxytocin/Lactated Ringer's (Pitocin In Lr 10 Units/1,000 Ml)  10 unit in 1,000 

mls @ 200 mls/hr IV NOW STA


   Stop: 18 00:07


   Last Admin: 18 12:49 Dose:  Not Given


Methylergonovine Maleate (Methergine) Confirm Administered Dose 0.2 mg .ROUTE 

.STK-MED ONE


   Stop: 18 17:13


   Last Admin: 18 17:17 Dose:  0.2 mg


Methylergonovine Maleate (Methergine)  0.2 mg IM NOW STA


   Stop: 18 17:37


   Last Admin: 18 12:49 Dose:  Not Given


Nalbuphine HCl (Nubain)  10 mg IVPUSH Q2H PRN


   PRN Reason: pain


Ondansetron HCl (Zofran)  4 mg IVPUSH Q4H PRN


   PRN Reason: Nausea/Vomiting


Phenylephrine HCl (Eric-Synephrine)  10 mg .ROUTE .[facility]-MED ONE


   Stop: 18 22:01


Sodium Chloride (Saline Flush)  10 ml FLUSH ASDIRECTED PRN


   PRN Reason: Keep Vein Open

## 2018-09-14 NOTE — PCM.PNPP
- General Info


Date of Service: 18


Functional Status: Reports: Pain Controlled, Tolerating Diet, Ambulating, 

Urinating





- Review of Systems


General: Reports: No Symptoms


Pulmonary: Reports: No Symptoms


Cardiovascular: Reports: No Symptoms


Gastrointestinal: Reports: No Symptoms


Genitourinary: Reports: No Symptoms


Musculoskeletal: Reports: No Symptoms





- Patient Data


Vital Signs - Most Recent: 


 Last Vital Signs











Temp  36.7 C   18 03:17


 


Pulse  87   18 03:17


 


Resp  14   18 03:17


 


BP  107/54 L  18 03:17


 


Pulse Ox  100   18 03:17











Weight - Most Recent: 68.901 kg


I&O - Last 24 Hours: 


 Intake & Output











 18





 14:59 22:59 06:59


 


Intake Total 120 60 


 


Balance 120 60 











Med Orders - Current: 


 Current Medications





Acetaminophen (Tylenol)  650 mg PO Q4H PRN


   PRN Reason: mild pain or fever


Benzocaine/Menthol (Dermoplast Pain Relief Spray)  0 gm TOP ASDIRECTED PRN


   PRN Reason: Perineal Comfort Measure


   Last Admin: 18 20:39 Dose:  1 canister


Docusate Sodium (Colace)  100 mg PO BID PRN


   PRN Reason: Constipation


   Last Admin: 18 08:41 Dose:  100 mg


Emollient Ointment (Lansinoh Hpa)  0 gm TOP ASDIRECTED PRN


   PRN Reason: Sore Nipples


   Last Admin: 18 05:20 Dose:  1 tube


Ibuprofen (Motrin)  600 mg PO Q6H PRN


   PRN Reason: Mild pain or fever


   Last Admin: 18 05:22 Dose:  600 mg


Witch Hazel (Tucks)  1 pad TOP ASDIRECTED PRN


   PRN Reason: Hemorrhoid pain


   Last Admin: 18 20:38 Dose:  1 canister





Discontinued Medications





Bupivacaine HCl (Sensorcaine-Mpf 0.25%)  10 ml .ROUTE .STK-MED ONE


   Stop: 18 22:01


Diphenhydramine HCl (Benadryl)  25 mg IVPUSH Q6H PRN


   PRN Reason: pruritis


Ephedrine Sulfate (Ephedrine Sulfate)  5 mg IVPUSH ASDIRECTED PRN


   PRN Reason: Hypotension


   Last Admin: 18 15:40 Dose:  5 mg


Ephedrine Sulfate (Ephedrine Sulfate)  100 mg .ROUTE .STK-MED ONE


   Stop: 18 22:01


Fentanyl (Sublimaze)  100 mcg EPIDUR Q3H PRN


   PRN Reason: Pain


   Last Admin: 18 07:39 Dose:  100 mcg


Fentanyl/Bupivacaine HCl (Fentanyl/Bupivacaine/Ns 2 Mcg-0.125% 100 Ml)  100 ml 

EPIDUR ASDIRECTED MARISSA


   Last Admin: 18 16:25 Dose:  100 ml


Lactated Ringer's (Ringers, Lactated)  1,000 mls @ 100 mls/hr IV ASDIRECTED Counts include 234 beds at the [facility]


   Last Admin: 18 13:59 Dose:  100 mls/hr


Oxytocin/Lactated Ringer's (Pitocin In Lr 10 Units/1,000 Ml)  10 unit in 1,000 

mls @ 500 mls/hr IV .CONTINUOUS MARISSA


   Last Admin: 18 17:16 Dose:  500 mls/hr


Oxytocin/Lactated Ringer's (Pitocin In Lr 10 Units/1,000 Ml) Confirm 

Administered Dose 10 unit in 1,000 mls @ as directed IV .STK-MED ONE


   Stop: 18 18:53


   Last Admin: 18 19:00 Dose:  200 mls/hr


Oxytocin/Lactated Ringer's (Pitocin In Lr 10 Units/1,000 Ml)  10 unit in 1,000 

mls @ 200 mls/hr IV NOW STA


   Stop: 18 00:07


   Last Admin: 18 12:49 Dose:  Not Given


Methylergonovine Maleate (Methergine) Confirm Administered Dose 0.2 mg .ROUTE 

.STK-MED ONE


   Stop: 18 17:13


   Last Admin: 18 17:17 Dose:  0.2 mg


Methylergonovine Maleate (Methergine)  0.2 mg IM NOW STA


   Stop: 18 17:37


   Last Admin: 18 12:49 Dose:  Not Given


Nalbuphine HCl (Nubain)  10 mg IVPUSH Q2H PRN


   PRN Reason: pain


Ondansetron HCl (Zofran)  4 mg IVPUSH Q4H PRN


   PRN Reason: Nausea/Vomiting


Phenylephrine HCl (Eric-Synephrine)  10 mg .ROUTE .STK-MED ONE


   Stop: 18 22:01


Sodium Chloride (Saline Flush)  10 ml FLUSH ASDIRECTED PRN


   PRN Reason: Keep Vein Open











- Infant Interaction


Infant Disposition, Postpartum:  in Room with Family


Infant Interaction: Holding Infant


Infant Feeding:  Infant; Nursed Well


Support Person: 





- Postpartum Recovery Exam


Fundal Tone: Firm


Fundal Level: 1 Fingerbreadths Below Umbilicus


Fundal Placement: Midline


Lochia Amount: Small


Lochia Color: Rubra/Red


Perineum Description: Edematous


Episiotomy/Laceration: Approximated


Bladder Status: Voiding


Urinary Elimination: Voided





- Exam


General: Alert, Oriented, Cooperative


GI/Abdominal Exam: Soft, Non-Tender


Extremities: Normal Inspection


Skin: Warm, Dry, Intact





- Problem List & Annotations


(1) 38 weeks gestation of pregnancy


SNOMED Code(s): 51067756


   Code(s): Z3A.38 - 38 WEEKS GESTATION OF PREGNANCY   Status: Acute   Current 

Visit: Yes   





(2) Normal labor


SNOMED Code(s): 95494856


   Code(s): O80 - ENCOUNTER FOR FULL-TERM UNCOMPLICATED DELIVERY; Z37.9 - 

OUTCOME OF DELIVERY, UNSPECIFIED   Status: Acute   Current Visit: Yes   





(3) History of 


SNOMED Code(s): 632407000


   Code(s): Z98.891 - HISTORY OF UTERINE SCAR FROM PREVIOUS SURGERY   Status: 

Acute   Current Visit: Yes   





(4) Desires  (vaginal birth after ) trial


SNOMED Code(s): 270514439, 368977141


   Code(s): O34.219 - MATERNAL CARE FOR UNSP TYPE SCAR FROM PREVIOUS  

DEL   Status: Acute   Current Visit: Yes   





(5) , delivered, current hospitalization


SNOMED Code(s): 644595200


   Code(s): O34.219 - MATERNAL CARE FOR UNSP TYPE SCAR FROM PREVIOUS  

DEL   Status: Acute   Current Visit: Yes   





- Problem List Review


Problem List Initiated/Reviewed/Updated: Yes





- My Orders


Last 24 Hours: 


My Active Orders





18 17:45


Heat Therapy [OM.PC] PRN 





18 06:58


Ready for Discharge [RC] PER UNIT ROUTINE 














- Assessment


Assessment:: 





22 y/o G3 now  PPD#2 from 





- Plan


Plan:: 








* Routine cares


* Encourage breast feeding


* Discharge home today